# Patient Record
Sex: FEMALE | Race: WHITE | NOT HISPANIC OR LATINO | Employment: UNEMPLOYED | URBAN - METROPOLITAN AREA
[De-identification: names, ages, dates, MRNs, and addresses within clinical notes are randomized per-mention and may not be internally consistent; named-entity substitution may affect disease eponyms.]

---

## 2019-07-03 ENCOUNTER — CLINICAL SUPPORT (OUTPATIENT)
Dept: GASTROENTEROLOGY | Facility: CLINIC | Age: 54
End: 2019-07-03

## 2019-07-03 VITALS — HEIGHT: 63 IN | WEIGHT: 239 LBS | BODY MASS INDEX: 42.35 KG/M2

## 2019-07-03 DIAGNOSIS — Z12.11 SCREENING FOR COLON CANCER: Primary | ICD-10-CM

## 2019-07-03 RX ORDER — LOSARTAN POTASSIUM 100 MG/1
100 TABLET ORAL DAILY
Refills: 1 | COMMUNITY
Start: 2019-07-01

## 2019-07-03 RX ORDER — BUPROPION HYDROCHLORIDE 150 MG/1
TABLET ORAL
Refills: 1 | COMMUNITY
Start: 2019-07-01

## 2019-07-03 NOTE — PROGRESS NOTES
Pt seen for colon prep dx screening med's reviewed instructions given for suprep sample given to patient

## 2019-07-19 ENCOUNTER — TELEPHONE (OUTPATIENT)
Dept: GASTROENTEROLOGY | Facility: CLINIC | Age: 54
End: 2019-07-19

## 2019-07-19 NOTE — TELEPHONE ENCOUNTER
Called patient with results from screening colonoscopy performed on July 11, 2019  No answer  Left voicemail  Multiple tubular adenomas, will to tubulovillous adenoma  Given the size, number of polyps, and the type of polyp, definitely a 3 year recall